# Patient Record
Sex: MALE | URBAN - METROPOLITAN AREA
[De-identification: names, ages, dates, MRNs, and addresses within clinical notes are randomized per-mention and may not be internally consistent; named-entity substitution may affect disease eponyms.]

---

## 2018-06-07 ENCOUNTER — APPOINTMENT (OUTPATIENT)
Dept: URBAN - METROPOLITAN AREA CLINIC 186 | Age: 33
Setting detail: DERMATOLOGY
End: 2018-06-07

## 2018-06-07 VITALS — HEIGHT: 71 IN | WEIGHT: 192 LBS

## 2018-06-07 DIAGNOSIS — L21.8 OTHER SEBORRHEIC DERMATITIS: ICD-10-CM

## 2018-06-07 DIAGNOSIS — D485 NEOPLASM OF UNCERTAIN BEHAVIOR OF SKIN: ICD-10-CM

## 2018-06-07 PROBLEM — D48.5 NEOPLASM OF UNCERTAIN BEHAVIOR OF SKIN: Status: ACTIVE | Noted: 2018-06-07

## 2018-06-07 PROCEDURE — OTHER COUNSELING: OTHER

## 2018-06-07 PROCEDURE — OTHER PRESCRIPTION: OTHER

## 2018-06-07 PROCEDURE — OTHER TREATMENT REGIMEN: OTHER

## 2018-06-07 PROCEDURE — OTHER CONSULTATION EXCISION: OTHER

## 2018-06-07 PROCEDURE — 99202 OFFICE O/P NEW SF 15 MIN: CPT

## 2018-06-07 PROCEDURE — OTHER DIAGNOSIS COMMENT: OTHER

## 2018-06-07 RX ORDER — KETOCONAZOLE 20.5 MG/ML
SHAMPOO, SUSPENSION TOPICAL BIW
Qty: 1 | Refills: 6 | Status: ERX | COMMUNITY
Start: 2018-06-07

## 2018-06-07 RX ORDER — CLOBETASOL PROPIONATE 0.5 MG/G
AEROSOL, FOAM TOPICAL
Qty: 1 | Refills: 3 | Status: ERX | COMMUNITY
Start: 2018-06-07

## 2018-06-07 ASSESSMENT — LOCATION DETAILED DESCRIPTION DERM: LOCATION DETAILED: MID-FRONTAL SCALP

## 2018-06-07 ASSESSMENT — LOCATION SIMPLE DESCRIPTION DERM: LOCATION SIMPLE: ANTERIOR SCALP

## 2018-06-07 ASSESSMENT — LOCATION ZONE DERM: LOCATION ZONE: SCALP

## 2018-06-07 NOTE — HPI: EVALUATION OF SKIN LESION(S)
How Severe Are Your Spot(S)?: severe
Have Your Spot(S) Been Treated In The Past?: has not been treated
Hpi Title: Evaluation of a Skin Lesion
Additional History: The patient states any shampoo he uses doesn’t help.
How Severe Are Your Spot(S)?: moderate

## 2018-06-07 NOTE — PROCEDURE: DIAGNOSIS COMMENT
Comment: Clinically difficult to discern exact etiology. Discussed based on exam would favor lipoma likely deep.  Discussed other ddx with the patient and recommend imaging to help further delineate diagnosis. Discussed ct and or mri and ultrasound. Discussed if lesions is a lipoma would require surgical excision to remove the lesion. Patient with think about further testing
Detail Level: Zone

## 2018-09-07 ENCOUNTER — APPOINTMENT (OUTPATIENT)
Dept: URBAN - METROPOLITAN AREA CLINIC 186 | Age: 33
Setting detail: DERMATOLOGY
End: 2018-09-07

## 2018-09-07 VITALS — WEIGHT: 190 LBS | HEIGHT: 72 IN

## 2018-09-07 DIAGNOSIS — L21.8 OTHER SEBORRHEIC DERMATITIS: ICD-10-CM

## 2018-09-07 DIAGNOSIS — L60.1 ONYCHOLYSIS: ICD-10-CM

## 2018-09-07 PROCEDURE — OTHER TREATMENT REGIMEN: OTHER

## 2018-09-07 PROCEDURE — OTHER PRESCRIPTION: OTHER

## 2018-09-07 PROCEDURE — 99213 OFFICE O/P EST LOW 20 MIN: CPT

## 2018-09-07 PROCEDURE — OTHER DIAGNOSIS COMMENT: OTHER

## 2018-09-07 PROCEDURE — OTHER ADDITIONAL NOTES: OTHER

## 2018-09-07 PROCEDURE — OTHER COUNSELING: OTHER

## 2018-09-07 RX ORDER — KETOCONAZOLE 20.5 MG/ML
SHAMPOO, SUSPENSION TOPICAL BIW
Qty: 1 | Refills: 6 | Status: ERX

## 2018-09-07 RX ORDER — CLOBETASOL PROPIONATE 0.5 MG/G
AEROSOL, FOAM TOPICAL
Qty: 1 | Refills: 3 | Status: ERX

## 2018-09-07 ASSESSMENT — LOCATION SIMPLE DESCRIPTION DERM
LOCATION SIMPLE: ANTERIOR SCALP
LOCATION SIMPLE: RIGHT MIDDLE FINGER

## 2018-09-07 ASSESSMENT — LOCATION ZONE DERM
LOCATION ZONE: FINGERNAIL
LOCATION ZONE: SCALP

## 2018-09-07 ASSESSMENT — LOCATION DETAILED DESCRIPTION DERM
LOCATION DETAILED: MID-FRONTAL SCALP
LOCATION DETAILED: RIGHT MIDDLE FINGERNAIL

## 2018-09-07 NOTE — PROCEDURE: ADDITIONAL NOTES
Additional Notes: Discussed most likely due to trauma to the nail. Recommend if symptoms don’t resolve follow up for possible clipping. Discussed keeping nail longer if clipping is needed

## 2018-09-07 NOTE — PROCEDURE: TREATMENT REGIMEN
Continue Regimen: Olux foam, apply to affected areas twice daily x3 weeks. Take 1 week off, repeat as needed\\nKetoconazole Shampoo 2%, wash scalp/skin 3-4 times a week. Patient to leave on 5-10 minutes before rinsing.
Detail Level: Zone

## 2018-09-07 NOTE — PROCEDURE: DIAGNOSIS COMMENT
Detail Level: Zone
Comment: Patient states controlled predominately with ketoconazole 2% shampoo. Minimal flares. Happy with results.